# Patient Record
Sex: MALE | Race: WHITE | NOT HISPANIC OR LATINO | URBAN - METROPOLITAN AREA
[De-identification: names, ages, dates, MRNs, and addresses within clinical notes are randomized per-mention and may not be internally consistent; named-entity substitution may affect disease eponyms.]

---

## 2017-02-10 ENCOUNTER — EMERGENCY (EMERGENCY)
Facility: HOSPITAL | Age: 42
LOS: 0 days | Discharge: HOME | End: 2017-02-10
Attending: EMERGENCY MEDICINE
Payer: COMMERCIAL

## 2017-02-10 VITALS
WEIGHT: 154.1 LBS | TEMPERATURE: 98 F | HEIGHT: 69 IN | HEART RATE: 72 BPM | DIASTOLIC BLOOD PRESSURE: 80 MMHG | SYSTOLIC BLOOD PRESSURE: 125 MMHG | OXYGEN SATURATION: 97 % | RESPIRATION RATE: 18 BRPM
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
## 2017-02-10 DIAGNOSIS — S20.219A CONTUSION OF UNSPECIFIED FRONT WALL OF THORAX, INITIAL ENCOUNTER: ICD-10-CM

## 2017-02-10 DIAGNOSIS — S13.9XXA SPRAIN OF JOINTS AND LIGAMENTS OF UNSPECIFIED PARTS OF NECK, INITIAL ENCOUNTER: ICD-10-CM
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
## 2017-02-10 DIAGNOSIS — M54.2 CERVICALGIA: ICD-10-CM
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
## 2017-02-10 PROCEDURE — 99284 EMERGENCY DEPT VISIT MOD MDM: CPT

## 2017-02-10 PROCEDURE — 71020: CPT | Mod: 26

## 2017-02-10 PROCEDURE — 72040 X-RAY EXAM NECK SPINE 2-3 VW: CPT | Mod: 26

## 2017-02-10 RX ORDER — METHOCARBAMOL 500 MG/1
1000 TABLET, FILM COATED ORAL ONCE
Qty: 0 | Refills: 0 | Status: COMPLETED | OUTPATIENT
Start: 2017-02-10 | End: 2017-02-10

## 2017-02-10 RX ORDER — KETOROLAC TROMETHAMINE 30 MG/ML
60 SYRINGE (ML) INJECTION ONCE
Qty: 0 | Refills: 0 | Status: DISCONTINUED | OUTPATIENT
Start: 2017-02-10 | End: 2017-02-10

## 2017-02-10 RX ORDER — CYCLOBENZAPRINE HYDROCHLORIDE 10 MG/1
1 TABLET, FILM COATED ORAL
Qty: 10 | Refills: 0 | OUTPATIENT
Start: 2017-02-10 | End: 2017-02-15

## 2017-02-10 RX ADMIN — Medication 60 MILLIGRAM(S): at 18:48

## 2017-02-10 RX ADMIN — Medication 60 MILLIGRAM(S): at 18:22

## 2017-02-10 RX ADMIN — METHOCARBAMOL 1000 MILLIGRAM(S): 500 TABLET, FILM COATED ORAL at 18:22

## 2017-02-10 NOTE — ED ADULT NURSE NOTE - OBJECTIVE STATEMENT
Patient stated that he fell while snowboarding, pain to collar bones and chest, pain of 6 on a scale of 0 to 10

## 2017-02-10 NOTE — ED PROVIDER NOTE - CARE PLAN
Principal Discharge DX:	Contusion of chest wall  Secondary Diagnosis:	Cervical sprain, initial encounter  Secondary Diagnosis:	Fall

## 2017-02-16 ENCOUNTER — MEDICATION RENEWAL (OUTPATIENT)
Age: 42
End: 2017-02-16

## 2017-08-25 ENCOUNTER — MEDICATION RENEWAL (OUTPATIENT)
Age: 42
End: 2017-08-25

## 2017-10-30 ENCOUNTER — MEDICATION RENEWAL (OUTPATIENT)
Age: 42
End: 2017-10-30

## 2018-03-02 ENCOUNTER — EMERGENCY (EMERGENCY)
Facility: HOSPITAL | Age: 43
LOS: 0 days | Discharge: HOME | End: 2018-03-02
Attending: EMERGENCY MEDICINE
Payer: COMMERCIAL

## 2018-03-02 VITALS
HEIGHT: 69 IN | WEIGHT: 154.98 LBS | SYSTOLIC BLOOD PRESSURE: 126 MMHG | HEART RATE: 79 BPM | DIASTOLIC BLOOD PRESSURE: 88 MMHG | OXYGEN SATURATION: 100 % | RESPIRATION RATE: 19 BRPM | TEMPERATURE: 98 F

## 2018-03-02 DIAGNOSIS — R07.81 PLEURODYNIA: ICD-10-CM
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
## 2018-03-02 DIAGNOSIS — R06.7 SNEEZING: ICD-10-CM

## 2018-03-02 DIAGNOSIS — J30.2 OTHER SEASONAL ALLERGIC RHINITIS: ICD-10-CM

## 2018-03-02 DIAGNOSIS — S20.219A CONTUSION OF UNSPECIFIED FRONT WALL OF THORAX, INITIAL ENCOUNTER: ICD-10-CM

## 2018-03-02 DIAGNOSIS — M54.9 DORSALGIA, UNSPECIFIED: ICD-10-CM

## 2018-03-02 PROCEDURE — 99284 EMERGENCY DEPT VISIT MOD MDM: CPT

## 2018-03-02 PROCEDURE — 71111 X-RAY EXAM RIBS/CHEST4/> VWS: CPT | Mod: 26

## 2018-03-02 RX ORDER — IBUPROFEN 200 MG
1 TABLET ORAL
Qty: 15 | Refills: 0 | OUTPATIENT
Start: 2018-03-02 | End: 2018-03-06

## 2018-03-02 RX ORDER — IBUPROFEN 200 MG
600 TABLET ORAL ONCE
Qty: 0 | Refills: 0 | Status: COMPLETED | OUTPATIENT
Start: 2018-03-02 | End: 2018-03-02

## 2018-03-02 RX ORDER — BUDESONIDE AND FORMOTEROL FUMARATE DIHYDRATE 160; 4.5 UG/1; UG/1
2 AEROSOL RESPIRATORY (INHALATION)
Qty: 0 | Refills: 0 | COMMUNITY

## 2018-03-02 RX ADMIN — Medication 600 MILLIGRAM(S): at 11:41

## 2018-03-02 NOTE — ED PROVIDER NOTE - MEDICAL DECISION MAKING DETAILS
hx, exam, pt remain very comfortable sitting up in the chair denies sob, nausea, vomiting and dizziness. pt is given and explained all test reports and advised to follow up with his pmd as soon as possible.

## 2018-03-02 NOTE — ED PROVIDER NOTE - MUSCULOSKELETAL MINIMAL EXAM
tender to palp bilateral anterior and posterior chest increases tenderness to wrap his arms around this anterior chest/TENDERNESS

## 2018-03-02 NOTE — ED PROVIDER NOTE - CONSTITUTIONAL, MLM
normal... Well appearing, well nourished, awake, alert, oriented to person, place, time/situation and in no apparent distress. Speaking in clear full sentences no nasal flaring no shoulders retractions not holding his chest or back no active coughing, smiling appears very comfortable sitting up in the chair in a bright light room

## 2018-03-02 NOTE — ED ADULT TRIAGE NOTE - CHIEF COMPLAINT QUOTE
"Last week Wednesday I fell snowboarding and I think I broke a rib, it has been night days and it has been getting worse"

## 2018-04-17 ENCOUNTER — APPOINTMENT (OUTPATIENT)
Dept: INTERNAL MEDICINE | Facility: CLINIC | Age: 43
End: 2018-04-17
Payer: COMMERCIAL

## 2018-04-17 VITALS
HEART RATE: 71 BPM | RESPIRATION RATE: 16 BRPM | BODY MASS INDEX: 24.14 KG/M2 | SYSTOLIC BLOOD PRESSURE: 125 MMHG | HEIGHT: 69 IN | DIASTOLIC BLOOD PRESSURE: 77 MMHG | WEIGHT: 163 LBS | TEMPERATURE: 97.8 F | OXYGEN SATURATION: 97 %

## 2018-04-17 PROCEDURE — 99386 PREV VISIT NEW AGE 40-64: CPT

## 2018-04-17 RX ORDER — AMOXICILLIN 875 MG/1
875 TABLET, FILM COATED ORAL
Qty: 20 | Refills: 0 | Status: DISCONTINUED | COMMUNITY
Start: 2018-01-25 | End: 2018-04-17

## 2018-04-17 RX ORDER — NEOMYCIN SULFATE, POLYMYXIN B SULFATE, HYDROCORTISONE 3.5; 10000; 1 MG/ML; [USP'U]/ML; MG/ML
1 SOLUTION/ DROPS AURICULAR (OTIC)
Qty: 10 | Refills: 0 | Status: DISCONTINUED | COMMUNITY
Start: 2018-01-25 | End: 2018-04-17

## 2018-04-23 ENCOUNTER — LABORATORY RESULT (OUTPATIENT)
Age: 43
End: 2018-04-23

## 2018-04-24 ENCOUNTER — MESSAGE (OUTPATIENT)
Age: 43
End: 2018-04-24

## 2018-04-24 DIAGNOSIS — R82.90 UNSPECIFIED ABNORMAL FINDINGS IN URINE: ICD-10-CM

## 2018-04-24 DIAGNOSIS — R17 UNSPECIFIED JAUNDICE: ICD-10-CM

## 2018-04-24 DIAGNOSIS — R94.5 ABNORMAL RESULTS OF LIVER FUNCTION STUDIES: ICD-10-CM

## 2018-04-24 LAB
25(OH)D3 SERPL-MCNC: 26.6 NG/ML
ALBUMIN SERPL ELPH-MCNC: 4.5 G/DL
ALP BLD-CCNC: 76 U/L
ALT SERPL-CCNC: 54 U/L
ANION GAP SERPL CALC-SCNC: 15 MMOL/L
APPEARANCE: CLEAR
AST SERPL-CCNC: 43 U/L
BASOPHILS # BLD AUTO: 0.1 K/UL
BASOPHILS NFR BLD AUTO: 1.5 %
BILIRUB SERPL-MCNC: 1.5 MG/DL
BILIRUBIN URINE: NEGATIVE
BLOOD URINE: NEGATIVE
BUN SERPL-MCNC: 15 MG/DL
CALCIUM SERPL-MCNC: 9.9 MG/DL
CHLORIDE SERPL-SCNC: 103 MMOL/L
CHOLEST SERPL-MCNC: 197 MG/DL
CHOLEST/HDLC SERPL: 2.7 RATIO
CO2 SERPL-SCNC: 27 MMOL/L
COLOR: ABNORMAL
CREAT SERPL-MCNC: 1.09 MG/DL
EOSINOPHIL # BLD AUTO: 0.84 K/UL
EOSINOPHIL NFR BLD AUTO: 12.8 %
GLUCOSE QUALITATIVE U: NEGATIVE MG/DL
GLUCOSE SERPL-MCNC: 93 MG/DL
HBA1C MFR BLD HPLC: 5.2 %
HCT VFR BLD CALC: 45.7 %
HDLC SERPL-MCNC: 73 MG/DL
HGB BLD-MCNC: 15.5 G/DL
IMM GRANULOCYTES NFR BLD AUTO: 0.2 %
KETONES URINE: NEGATIVE
LDLC SERPL CALC-MCNC: 110 MG/DL
LEUKOCYTE ESTERASE URINE: NEGATIVE
LYMPHOCYTES # BLD AUTO: 2.2 K/UL
LYMPHOCYTES NFR BLD AUTO: 33.5 %
MAN DIFF?: NORMAL
MCHC RBC-ENTMCNC: 29.9 PG
MCHC RBC-ENTMCNC: 33.9 GM/DL
MCV RBC AUTO: 88.2 FL
MONOCYTES # BLD AUTO: 0.53 K/UL
MONOCYTES NFR BLD AUTO: 8.1 %
NEUTROPHILS # BLD AUTO: 2.88 K/UL
NEUTROPHILS NFR BLD AUTO: 43.9 %
NITRITE URINE: NEGATIVE
PH URINE: 5.5
PLATELET # BLD AUTO: 229 K/UL
POTASSIUM SERPL-SCNC: 4.8 MMOL/L
PROT SERPL-MCNC: 7.3 G/DL
PROTEIN URINE: ABNORMAL MG/DL
PSA SERPL-MCNC: 0.54 NG/ML
RBC # BLD: 5.18 M/UL
RBC # FLD: 13.6 %
SODIUM SERPL-SCNC: 145 MMOL/L
SPECIFIC GRAVITY URINE: 1.03
TRIGL SERPL-MCNC: 71 MG/DL
TSH SERPL-ACNC: 4.08 UIU/ML
UROBILINOGEN URINE: NEGATIVE MG/DL
WBC # FLD AUTO: 6.56 K/UL

## 2018-04-26 LAB
APPEARANCE: CLEAR
BILIRUBIN URINE: NEGATIVE
BLOOD URINE: NEGATIVE
COLOR: YELLOW
GLUCOSE QUALITATIVE U: NEGATIVE MG/DL
KETONES URINE: NEGATIVE
LEUKOCYTE ESTERASE URINE: NEGATIVE
NITRITE URINE: NEGATIVE
PH URINE: 7
PROTEIN URINE: NEGATIVE MG/DL
SPECIFIC GRAVITY URINE: 1.01
UROBILINOGEN URINE: NEGATIVE MG/DL

## 2018-04-27 LAB
ALBUMIN SERPL ELPH-MCNC: 4.7 G/DL
ALP BLD-CCNC: 75 U/L
ALT SERPL-CCNC: 46 U/L
ANION GAP SERPL CALC-SCNC: 15 MMOL/L
AST SERPL-CCNC: 33 U/L
BILIRUB SERPL-MCNC: 1.7 MG/DL
BUN SERPL-MCNC: 14 MG/DL
CALCIUM SERPL-MCNC: 9.9 MG/DL
CHLORIDE SERPL-SCNC: 99 MMOL/L
CO2 SERPL-SCNC: 26 MMOL/L
CREAT SERPL-MCNC: 0.99 MG/DL
GLUCOSE SERPL-MCNC: 81 MG/DL
POTASSIUM SERPL-SCNC: 4 MMOL/L
PROT SERPL-MCNC: 7.5 G/DL
SODIUM SERPL-SCNC: 140 MMOL/L

## 2018-09-27 ENCOUNTER — MEDICATION RENEWAL (OUTPATIENT)
Age: 43
End: 2018-09-27

## 2018-10-16 ENCOUNTER — APPOINTMENT (OUTPATIENT)
Dept: PULMONOLOGY | Facility: CLINIC | Age: 43
End: 2018-10-16
Payer: COMMERCIAL

## 2018-10-16 ENCOUNTER — OUTPATIENT (OUTPATIENT)
Dept: OUTPATIENT SERVICES | Facility: HOSPITAL | Age: 43
LOS: 1 days | End: 2018-10-16
Payer: COMMERCIAL

## 2018-10-16 ENCOUNTER — APPOINTMENT (OUTPATIENT)
Dept: RADIOLOGY | Facility: IMAGING CENTER | Age: 43
End: 2018-10-16
Payer: COMMERCIAL

## 2018-10-16 ENCOUNTER — LABORATORY RESULT (OUTPATIENT)
Age: 43
End: 2018-10-16

## 2018-10-16 VITALS
DIASTOLIC BLOOD PRESSURE: 80 MMHG | HEIGHT: 69 IN | WEIGHT: 166 LBS | RESPIRATION RATE: 16 BRPM | TEMPERATURE: 98.8 F | BODY MASS INDEX: 24.59 KG/M2 | SYSTOLIC BLOOD PRESSURE: 133 MMHG | HEART RATE: 85 BPM

## 2018-10-16 DIAGNOSIS — J45.901 UNSPECIFIED ASTHMA WITH (ACUTE) EXACERBATION: ICD-10-CM

## 2018-10-16 DIAGNOSIS — R06.02 SHORTNESS OF BREATH: ICD-10-CM

## 2018-10-16 PROCEDURE — 71045 X-RAY EXAM CHEST 1 VIEW: CPT

## 2018-10-16 PROCEDURE — 99215 OFFICE O/P EST HI 40 MIN: CPT

## 2018-10-16 PROCEDURE — 71045 X-RAY EXAM CHEST 1 VIEW: CPT | Mod: 26

## 2018-10-19 LAB
A ALTERNATA IGE QN: <0.1 KUA/L
A FUMIGATUS IGE QN: <0.1 KUA/L
BASOPHILS # BLD AUTO: 0.11 K/UL
BASOPHILS NFR BLD AUTO: 1.3 %
C ALBICANS IGE QN: <0.1 KUA/L
C HERBARUM IGE QN: <0.1 KUA/L
CAT DANDER IGE QN: 97.1 KUA/L
COMMON RAGWEED IGE QN: <0.1 KUA/L
D FARINAE IGE QN: <0.1 KUA/L
D PTERONYSS IGE QN: <0.1 KUA/L
DEPRECATED A ALTERNATA IGE RAST QL: 0
DEPRECATED A FUMIGATUS IGE RAST QL: 0
DEPRECATED C ALBICANS IGE RAST QL: 0
DEPRECATED C HERBARUM IGE RAST QL: 0
DEPRECATED CAT DANDER IGE RAST QL: ABNORMAL
DEPRECATED COMMON RAGWEED IGE RAST QL: 0
DEPRECATED D FARINAE IGE RAST QL: 0
DEPRECATED D PTERONYSS IGE RAST QL: 0
DEPRECATED DOG DANDER IGE RAST QL: ABNORMAL
DEPRECATED M RACEMOSUS IGE RAST QL: 0
DEPRECATED ROACH IGE RAST QL: 0
DEPRECATED TIMOTHY IGE RAST QL: 0
DEPRECATED WHITE OAK IGE RAST QL: 0
DOG DANDER IGE QN: 68.7 KUA/L
EOSINOPHIL # BLD AUTO: 0.69 K/UL
EOSINOPHIL NFR BLD AUTO: 8.2 %
HCT VFR BLD CALC: 45.2 %
HGB BLD-MCNC: 15.3 G/DL
IMM GRANULOCYTES NFR BLD AUTO: 1.8 %
LYMPHOCYTES # BLD AUTO: 3.05 K/UL
LYMPHOCYTES NFR BLD AUTO: 36.4 %
M RACEMOSUS IGE QN: <0.1 KUA/L
MAN DIFF?: NORMAL
MCHC RBC-ENTMCNC: 29.8 PG
MCHC RBC-ENTMCNC: 33.8 GM/DL
MCV RBC AUTO: 88.1 FL
MONOCYTES # BLD AUTO: 0.69 K/UL
MONOCYTES NFR BLD AUTO: 8.2 %
NEUTROPHILS # BLD AUTO: 3.68 K/UL
NEUTROPHILS NFR BLD AUTO: 44.1 %
PLATELET # BLD AUTO: 372 K/UL
RBC # BLD: 5.13 M/UL
RBC # FLD: 13.1 %
ROACH IGE QN: <0.1 KUA/L
TIMOTHY IGE QN: <0.1 KUA/L
TOTAL IGE SMQN RAST: 535 KU/L
WBC # FLD AUTO: 8.37 K/UL
WHITE OAK IGE QN: <0.1 KUA/L

## 2018-12-06 ENCOUNTER — APPOINTMENT (OUTPATIENT)
Dept: PULMONOLOGY | Facility: CLINIC | Age: 43
End: 2018-12-06
Payer: SELF-PAY

## 2018-12-06 VITALS
HEART RATE: 81 BPM | SYSTOLIC BLOOD PRESSURE: 124 MMHG | DIASTOLIC BLOOD PRESSURE: 76 MMHG | BODY MASS INDEX: 25.48 KG/M2 | TEMPERATURE: 97.3 F | OXYGEN SATURATION: 96 % | HEIGHT: 69 IN | WEIGHT: 172 LBS

## 2018-12-06 DIAGNOSIS — J30.9 ALLERGIC RHINITIS, UNSPECIFIED: ICD-10-CM

## 2018-12-06 PROCEDURE — ZZZZZ: CPT

## 2018-12-06 PROCEDURE — 94729 DIFFUSING CAPACITY: CPT

## 2018-12-06 PROCEDURE — 94060 EVALUATION OF WHEEZING: CPT

## 2018-12-06 PROCEDURE — 94726 PLETHYSMOGRAPHY LUNG VOLUMES: CPT

## 2018-12-06 PROCEDURE — 99214 OFFICE O/P EST MOD 30 MIN: CPT | Mod: 25

## 2019-10-27 ENCOUNTER — RX RENEWAL (OUTPATIENT)
Age: 44
End: 2019-10-27

## 2020-03-12 ENCOUNTER — RX RENEWAL (OUTPATIENT)
Age: 45
End: 2020-03-12

## 2021-11-20 ENCOUNTER — EMERGENCY (EMERGENCY)
Facility: HOSPITAL | Age: 46
LOS: 1 days | Discharge: HOME | End: 2021-11-20
Admitting: EMERGENCY MEDICINE
Payer: COMMERCIAL

## 2021-11-20 VITALS
HEIGHT: 69 IN | HEART RATE: 70 BPM | RESPIRATION RATE: 16 BRPM | DIASTOLIC BLOOD PRESSURE: 79 MMHG | OXYGEN SATURATION: 100 % | SYSTOLIC BLOOD PRESSURE: 129 MMHG | TEMPERATURE: 97 F

## 2021-11-20 PROCEDURE — 99284 EMERGENCY DEPT VISIT MOD MDM: CPT

## 2021-11-20 RX ORDER — LIDOCAINE 4 G/100G
1 CREAM TOPICAL ONCE
Refills: 0 | Status: COMPLETED | OUTPATIENT
Start: 2021-11-20 | End: 2021-11-20

## 2021-11-20 RX ORDER — ACETAMINOPHEN 500 MG
650 TABLET ORAL ONCE
Refills: 0 | Status: COMPLETED | OUTPATIENT
Start: 2021-11-20 | End: 2021-11-20

## 2021-11-20 RX ORDER — OXYCODONE HYDROCHLORIDE 5 MG/1
1 TABLET ORAL
Qty: 2 | Refills: 0
Start: 2021-11-20 | End: 2021-11-20

## 2021-11-20 RX ADMIN — Medication 650 MILLIGRAM(S): at 12:51

## 2021-11-20 RX ADMIN — LIDOCAINE 1 PATCH: 4 CREAM TOPICAL at 12:52

## 2021-11-20 NOTE — ED PROVIDER NOTE - PATIENT PORTAL LINK FT
You can access the FollowMyHealth Patient Portal offered by [facility] by registering at the following website: [email or web address]. By joining [facility]’s FollowMyHealth portal, you will also be able to view your health information using other applications (apps) compatible with our system.

## 2021-11-20 NOTE — ED PROVIDER NOTE - NS ED ROS FT
Constitutional: (-) Fever, (-) Chills  Skin: (-) Color changes, (-) Rashes, (-) Wounds  Eyes: (-) Visual changes, (-) Discharge, (-) Redness  CV: (-) Chest pain, (-) Palpitations  Resp: (-) Cough, (-) Shortness of breath  GI: (-) Abdominal pain, (-) Nausea, (-) Vomiting, (-) Diarrhea  : (-) Dysuria, (-) Hematuria, (-) Increased frequency, (-) Incontinence, (-) Retention, (-) Saddle anesthesia   MSK: (+) Back pain, (-) Myalgias  Neuro: (-) Headache

## 2021-11-20 NOTE — ED PROVIDER NOTE - NSFOLLOWUPINSTRUCTIONS_ED_ALL_ED_FT
Today you were seen in the ER for back pain.     Take Motrin 600 mg every 6-8 hours as needed for moderate pain -- take with food.    Take Tylenol 650mg (Two 325 mg pills) every 4-6 hours as needed for pain.    Take Oxycodone 5mg every 6 hours as needed for severe pain.     Back Pain    Back pain is very common in adults. The cause of back pain is rarely dangerous and the pain often gets better over time. The cause of your back pain may not be known and may include strain of muscles or ligaments, degeneration of the spinal disks, or arthritis. Occasionally the pain may radiate down your leg(s). Over-the-counter medicines to reduce pain and inflammation are often the most helpful. Stretching and remaining active frequently helps the healing process.     SEEK IMMEDIATE MEDICAL CARE IF YOU HAVE ANY OF THE FOLLOWING SYMPTOMS: bowel or bladder control problems, unusual weakness or numbness in your arms or legs, nausea or vomiting, abdominal pain, fever, dizziness/lightheadedness.    Advance activity as tolerated.     Continue all previously prescribed medications as directed unless otherwise instructed.     Follow up with your primary care physician in 48-72 hours- bring copies of your results.     Follow up with ortho spine as soon as possible. You should receive a call from the discharge lounge to assist with setting up this appointment.

## 2021-11-20 NOTE — ED PROVIDER NOTE - OBJECTIVE STATEMENT
46y Male with no sig PMHx presents to the ER for back pain. Patient reports atraumatic, bilateral lower back pain that has been going on since the end of October and got worse yesterday. States he drove from [facility] last night and had 10/10 bilateral, nonradiating lower back pain once he arrived. States pain is better when standing and stretching, worse with prolonged sitting. Denies fever, chills, numbness, tingling, bowel or bladder incontinence, urinary retention, saddles anesthesia. Pain minimally improved with Ibuprofen, last dose 8am.

## 2021-11-20 NOTE — ED PROVIDER NOTE - CPE EDP RESP NORM
Pt returned a call to Kelin Kerr for her results. She was not available at the time of her call. Please return her call at the earliest convenience. normal...

## 2021-11-20 NOTE — ED PROVIDER NOTE - CLINICAL SUMMARY MEDICAL DECISION MAKING FREE TEXT BOX
46y Male with no sig PMHx presents to the ER for back pain. Patient reports atraumatic, bilateral lower back pain that has been going on since the end of October and got worse yesterday. Denies fever, chills, numbness, tingling, bowel or bladder incontinence, urinary retention, saddles anesthesia. Pain minimally improved with Ibuprofen, last dose 8am. Vital signs stable. Bilateral paraspinal lumbar tenderness, no focal neuro deficit. Will get meds, ortho spine follow up, [place].

## 2021-11-24 ENCOUNTER — APPOINTMENT (OUTPATIENT)
Dept: ORTHOPEDIC SURGERY | Facility: CLINIC | Age: 46
End: 2021-11-24
Payer: COMMERCIAL

## 2021-11-24 VITALS — WEIGHT: 172 LBS | BODY MASS INDEX: 25.48 KG/M2 | HEIGHT: 69 IN

## 2021-11-24 DIAGNOSIS — M54.50 LOW BACK PAIN, UNSPECIFIED: ICD-10-CM

## 2021-11-24 PROCEDURE — 99203 OFFICE O/P NEW LOW 30 MIN: CPT

## 2021-11-24 PROCEDURE — 72100 X-RAY EXAM L-S SPINE 2/3 VWS: CPT

## 2021-11-24 NOTE — DISCUSSION/SUMMARY
[de-identified] : We discussed further treatment options.  He has now had symptoms for 1 month.  He has tried physical therapy medications without relief.  He will try an oral steroid taper.  He will also try some additional therapy but we will obtain a lumbar spine MRI.  Follow-up afterwards.

## 2021-11-24 NOTE — PHYSICAL EXAM
[Antalgic] : antalgic [Stooped] : stooped [de-identified] : Examination of the lumbar spine reveals no midline tenderness palpation, step-offs, or skin lesions. Decreased range of motion with respect to flexion, extension, lateral bending, and rotation. No tenderness to palpation of the sciatic notch. No tenderness palpation of the bilateral greater trochanters. No pain with passive internal/external rotation of the hips. No instability of bilateral lower extremities.  Negative ROBERT. Negative straight leg raise bilaterally. No bowstring. Negative femoral stretch. 5 out of 5 iliopsoas, hip abductors, hips adductors, quadriceps, hamstrings, gastrocsoleus, tibialis anterior, extensor hallucis longus, peroneals. Grossly intact sensation to light touch bilateral lower extremities. 1+ patellar and Achilles reflexes. Downgoing Babinski. No clonus. Intact proprioception. Palpable pulses. No skin lesion and no edema on the right and left lower extremities. [de-identified] : AP lateral lumbar x-rays reveal spondylosis without instability or aggressive lesions

## 2021-11-24 NOTE — HISTORY OF PRESENT ILLNESS
[de-identified] : Mr. NA SWANSON  is a 46 year old male who presents with low back pain since 10/29/21 that he feels began since he was working/sitting for very long days for months.  His pain was so bad that he went to the ER this past weekend.  He has been taking nsaids and using heat for symptom control.  Driving/sitting is his worst position.  Denies any LE radicular symptoms.  Normal bowel and bladder control.   Denies any recent fevers, chills, sweats, weight loss, or infection.  \par \par The patients past medical history, past surgical history, medications, allergies, and social history were reviewed by me today with the patient and documented accordingly.  In addition, the patient's family history, which is noncontributory to their visit, was also reviewed.\par

## 2021-12-04 ENCOUNTER — APPOINTMENT (OUTPATIENT)
Dept: MRI IMAGING | Facility: CLINIC | Age: 46
End: 2021-12-04
Payer: COMMERCIAL

## 2021-12-04 PROCEDURE — 72148 MRI LUMBAR SPINE W/O DYE: CPT

## 2021-12-17 ENCOUNTER — APPOINTMENT (OUTPATIENT)
Dept: ORTHOPEDIC SURGERY | Facility: CLINIC | Age: 46
End: 2021-12-17
Payer: COMMERCIAL

## 2021-12-17 DIAGNOSIS — M47.817 SPONDYLOSIS W/OUT MYELOPATHY OR RADICULOPATHY, LUMBOSACRAL REGION: ICD-10-CM

## 2021-12-17 PROCEDURE — 99442: CPT

## 2022-12-01 ENCOUNTER — APPOINTMENT (OUTPATIENT)
Dept: INTERNAL MEDICINE | Facility: CLINIC | Age: 47
End: 2022-12-01

## 2022-12-01 ENCOUNTER — NON-APPOINTMENT (OUTPATIENT)
Age: 47
End: 2022-12-01

## 2022-12-01 VITALS
BODY MASS INDEX: 34.55 KG/M2 | TEMPERATURE: 98 F | HEIGHT: 60 IN | OXYGEN SATURATION: 97 % | SYSTOLIC BLOOD PRESSURE: 131 MMHG | WEIGHT: 176 LBS | HEART RATE: 80 BPM | DIASTOLIC BLOOD PRESSURE: 83 MMHG

## 2022-12-01 DIAGNOSIS — Z00.00 ENCOUNTER FOR GENERAL ADULT MEDICAL EXAMINATION W/OUT ABNORMAL FINDINGS: ICD-10-CM

## 2022-12-01 DIAGNOSIS — Z12.11 ENCOUNTER FOR SCREENING FOR MALIGNANT NEOPLASM OF COLON: ICD-10-CM

## 2022-12-01 LAB
25(OH)D3 SERPL-MCNC: 30.9 NG/ML
ALBUMIN SERPL ELPH-MCNC: 4.6 G/DL
ALP BLD-CCNC: 79 U/L
ALT SERPL-CCNC: 27 U/L
ANION GAP SERPL CALC-SCNC: 11 MMOL/L
AST SERPL-CCNC: 22 U/L
BILIRUB SERPL-MCNC: 0.9 MG/DL
BUN SERPL-MCNC: 11 MG/DL
CALCIUM SERPL-MCNC: 9.7 MG/DL
CHLORIDE SERPL-SCNC: 103 MMOL/L
CHOLEST SERPL-MCNC: 234 MG/DL
CO2 SERPL-SCNC: 26 MMOL/L
CREAT SERPL-MCNC: 0.95 MG/DL
EGFR: 99 ML/MIN/1.73M2
GLUCOSE SERPL-MCNC: 87 MG/DL
HDLC SERPL-MCNC: 63 MG/DL
LDLC SERPL CALC-MCNC: 159 MG/DL
NONHDLC SERPL-MCNC: 171 MG/DL
POTASSIUM SERPL-SCNC: 4.3 MMOL/L
PROT SERPL-MCNC: 7.2 G/DL
SODIUM SERPL-SCNC: 140 MMOL/L
T4 FREE SERPL-MCNC: 1.1 NG/DL
TRIGL SERPL-MCNC: 59 MG/DL
TSH SERPL-ACNC: 4.89 UIU/ML
VIT B12 SERPL-MCNC: 1138 PG/ML

## 2022-12-01 PROCEDURE — 99396 PREV VISIT EST AGE 40-64: CPT | Mod: 25

## 2022-12-01 PROCEDURE — 90686 IIV4 VACC NO PRSV 0.5 ML IM: CPT

## 2022-12-01 PROCEDURE — G0008: CPT

## 2022-12-01 RX ORDER — DOXYCYCLINE HYCLATE 100 MG/1
100 CAPSULE ORAL
Qty: 42 | Refills: 0 | Status: DISCONTINUED | COMMUNITY
Start: 2022-06-23

## 2022-12-01 NOTE — HISTORY OF PRESENT ILLNESS
[de-identified] : 47 years old male presents for annual wellness visit, states had asthma exacerbation about two weeks ago, now improved, refers also h/o hemorrhoids

## 2022-12-01 NOTE — PLAN
[FreeTextEntry1] : 1. annual physical exam\par * routine general labs done\par * age appropriate health maintenance recommendations reviewed, discussed including healthy diet, regular exercise\par 2. asthma\par * continue albuterol HFA as needed\par * refill of Symbicort\par * avoidance of asthma triggers discussed, reviewed, he has two cats at home, advised to avoid contact with them and keep them in a separate room\par * advised pulmonary follow up\par 3. hemorrhoids\par * high fiber diet recommended\par * sitz baths\par * start Proctozone HC cream\par 4. colon cancer screening\par * FIT test\par * GI referral for colonoscopy\par 5. need for influenza vaccination\par * influenza vaccine administered\par * follow up one month.

## 2022-12-01 NOTE — HEALTH RISK ASSESSMENT
[Good] : ~his/her~  mood as  good [Never] : Never [No] : No [No falls in past year] : Patient reported no falls in the past year [0] : 2) Feeling down, depressed, or hopeless: Not at all (0) [PHQ-2 Negative - No further assessment needed] : PHQ-2 Negative - No further assessment needed [HIV Test offered] : HIV Test offered [Hepatitis C test offered] : Hepatitis C test offered [None] : None [With Family] : lives with family [Employed] : employed [Graduate School] : graduate school [] :  [# Of Children ___] : has [unfilled] children [Sexually Active] : sexually active [Feels Safe at Home] : Feels safe at home [Fully functional (bathing, dressing, toileting, transferring, walking, feeding)] : Fully functional (bathing, dressing, toileting, transferring, walking, feeding) [Fully functional (using the telephone, shopping, preparing meals, housekeeping, doing laundry, using] : Fully functional and needs no help or supervision to perform IADLs (using the telephone, shopping, preparing meals, housekeeping, doing laundry, using transportation, managing medications and managing finances) [Smoke Detector] : smoke detector [Carbon Monoxide Detector] : carbon monoxide detector [Seat Belt] :  uses seat belt [[id number]] : 0 [Change in mental status noted] : No change in mental status noted [Reports changes in hearing] : Reports no changes in hearing [Reports changes in vision] : Reports no changes in vision [Reports changes in dental health] : Reports no changes in dental health [Guns at Home] : no guns at home [FreeTextEntry2] :

## 2022-12-02 LAB
APPEARANCE: CLEAR
BACTERIA: NEGATIVE
BASOPHILS # BLD AUTO: 0.1 K/UL
BASOPHILS NFR BLD AUTO: 1.7 %
BILIRUBIN URINE: NEGATIVE
BLOOD URINE: NEGATIVE
COLOR: NORMAL
EOSINOPHIL # BLD AUTO: 0.58 K/UL
EOSINOPHIL NFR BLD AUTO: 9.7 %
ESTIMATED AVERAGE GLUCOSE: 103 MG/DL
GLUCOSE QUALITATIVE U: NEGATIVE
HBA1C MFR BLD HPLC: 5.2 %
HCT VFR BLD CALC: 46.2 %
HGB BLD-MCNC: 15.4 G/DL
HYALINE CASTS: 1 /LPF
IMM GRANULOCYTES NFR BLD AUTO: 0.3 %
KETONES URINE: NEGATIVE
LEUKOCYTE ESTERASE URINE: NEGATIVE
LYMPHOCYTES # BLD AUTO: 2.38 K/UL
LYMPHOCYTES NFR BLD AUTO: 39.8 %
MAN DIFF?: NORMAL
MCHC RBC-ENTMCNC: 29.1 PG
MCHC RBC-ENTMCNC: 33.3 GM/DL
MCV RBC AUTO: 87.3 FL
MICROSCOPIC-UA: NORMAL
MONOCYTES # BLD AUTO: 0.43 K/UL
MONOCYTES NFR BLD AUTO: 7.2 %
NEUTROPHILS # BLD AUTO: 2.47 K/UL
NEUTROPHILS NFR BLD AUTO: 41.3 %
NITRITE URINE: NEGATIVE
PH URINE: 7
PLATELET # BLD AUTO: 287 K/UL
PROTEIN URINE: NORMAL
RBC # BLD: 5.29 M/UL
RBC # FLD: 12.9 %
RED BLOOD CELLS URINE: 1 /HPF
SPECIFIC GRAVITY URINE: 1.02
SQUAMOUS EPITHELIAL CELLS: 0 /HPF
UROBILINOGEN URINE: NORMAL
WBC # FLD AUTO: 5.98 K/UL
WHITE BLOOD CELLS URINE: 1 /HPF

## 2023-01-09 ENCOUNTER — APPOINTMENT (OUTPATIENT)
Dept: INTERNAL MEDICINE | Facility: CLINIC | Age: 48
End: 2023-01-09
Payer: COMMERCIAL

## 2023-01-09 VITALS
OXYGEN SATURATION: 98 % | SYSTOLIC BLOOD PRESSURE: 135 MMHG | TEMPERATURE: 97.8 F | WEIGHT: 170 LBS | DIASTOLIC BLOOD PRESSURE: 80 MMHG | HEART RATE: 65 BPM | BODY MASS INDEX: 33.38 KG/M2 | HEIGHT: 60 IN

## 2023-01-09 PROCEDURE — 99214 OFFICE O/P EST MOD 30 MIN: CPT

## 2023-01-09 RX ORDER — ALBUTEROL SULFATE 90 UG/1
108 (90 BASE) AEROSOL, METERED RESPIRATORY (INHALATION) EVERY 6 HOURS
Qty: 1 | Refills: 3 | Status: ACTIVE | COMMUNITY
Start: 2018-04-17 | End: 1900-01-01

## 2023-01-09 NOTE — HISTORY OF PRESENT ILLNESS
[FreeTextEntry1] : follow up [de-identified] : 47 years old male presents for follow up to review and discuss labs test results; no complaints; states he didn’t receive inhalers because insurance didn’t approve them, wants them to be sent to mail order pharmacy

## 2023-01-09 NOTE — PLAN
[FreeTextEntry1] : * GI referral for colonoscopy given again, instructed to schedule consultation\par * low cholesterol, low triglycerides diet,dietary counseling given; dietary avoidance discussed; diet and exercise reviewed with patient\par * new prescription for Ventolin and Symbicort\par * follow up three months.

## 2023-05-16 ENCOUNTER — APPOINTMENT (OUTPATIENT)
Dept: INTERNAL MEDICINE | Facility: CLINIC | Age: 48
End: 2023-05-16
Payer: COMMERCIAL

## 2023-05-16 VITALS
SYSTOLIC BLOOD PRESSURE: 128 MMHG | HEIGHT: 69 IN | DIASTOLIC BLOOD PRESSURE: 84 MMHG | HEART RATE: 75 BPM | WEIGHT: 172 LBS | OXYGEN SATURATION: 97 % | BODY MASS INDEX: 25.48 KG/M2 | TEMPERATURE: 98 F

## 2023-05-16 DIAGNOSIS — R19.5 OTHER FECAL ABNORMALITIES: ICD-10-CM

## 2023-05-16 PROCEDURE — 99214 OFFICE O/P EST MOD 30 MIN: CPT

## 2023-05-16 NOTE — HISTORY OF PRESENT ILLNESS
[FreeTextEntry1] : follow up [de-identified] : 47 years old male presents for follow up, states feeling well ; has not schedule GI consult for colonoscopy yet, had positive FIT test, h/o hemorrhoids; due for labs to monitor lipids on diet for high cholesterol, states being compliant.

## 2023-05-16 NOTE — PLAN
[FreeTextEntry1] : * lab for fasting lipids, CMP\par * low cholesterol, low triglycerides diet,dietary counseling given; dietary avoidance discussed; diet and exercise reviewed with patient\par * importance of colonoscopy for colon cancer screening reviewed, stressed\par * instructed to schedule GI consult\par * asthma stable; needs refill of symbicort\par * will call back for results\par * f/u six months

## 2023-05-17 LAB
ALBUMIN SERPL ELPH-MCNC: 4.8 G/DL
ALP BLD-CCNC: 83 U/L
ALT SERPL-CCNC: 35 U/L
ANION GAP SERPL CALC-SCNC: 11 MMOL/L
AST SERPL-CCNC: 24 U/L
BILIRUB SERPL-MCNC: 1.4 MG/DL
BUN SERPL-MCNC: 16 MG/DL
CALCIUM SERPL-MCNC: 10.1 MG/DL
CHLORIDE SERPL-SCNC: 104 MMOL/L
CHOLEST SERPL-MCNC: 209 MG/DL
CO2 SERPL-SCNC: 25 MMOL/L
CREAT SERPL-MCNC: 0.9 MG/DL
EGFR: 105 ML/MIN/1.73M2
GLUCOSE SERPL-MCNC: 79 MG/DL
HDLC SERPL-MCNC: 75 MG/DL
LDLC SERPL CALC-MCNC: 118 MG/DL
NONHDLC SERPL-MCNC: 134 MG/DL
POTASSIUM SERPL-SCNC: 4.4 MMOL/L
PROT SERPL-MCNC: 7.5 G/DL
SODIUM SERPL-SCNC: 140 MMOL/L
TRIGL SERPL-MCNC: 80 MG/DL

## 2023-06-19 ENCOUNTER — APPOINTMENT (OUTPATIENT)
Dept: GASTROENTEROLOGY | Facility: CLINIC | Age: 48
End: 2023-06-19
Payer: COMMERCIAL

## 2023-06-19 VITALS
DIASTOLIC BLOOD PRESSURE: 80 MMHG | WEIGHT: 166 LBS | TEMPERATURE: 97.5 F | SYSTOLIC BLOOD PRESSURE: 110 MMHG | HEIGHT: 69 IN | BODY MASS INDEX: 24.59 KG/M2

## 2023-06-19 DIAGNOSIS — K64.9 UNSPECIFIED HEMORRHOIDS: ICD-10-CM

## 2023-06-19 DIAGNOSIS — Z80.9 FAMILY HISTORY OF MALIGNANT NEOPLASM, UNSPECIFIED: ICD-10-CM

## 2023-06-19 PROCEDURE — 99204 OFFICE O/P NEW MOD 45 MIN: CPT

## 2023-06-19 RX ORDER — MONTELUKAST 10 MG/1
10 TABLET, FILM COATED ORAL
Qty: 1 | Refills: 3 | Status: DISCONTINUED | COMMUNITY
Start: 2018-10-16 | End: 2023-06-19

## 2023-06-19 RX ORDER — SODIUM SULFATE, POTASSIUM SULFATE AND MAGNESIUM SULFATE 1.6; 3.13; 17.5 G/177ML; G/177ML; G/177ML
17.5-3.13-1.6 SOLUTION ORAL
Qty: 354 | Refills: 0 | Status: ACTIVE | COMMUNITY
Start: 2023-06-19 | End: 1900-01-01

## 2023-06-19 RX ORDER — HYDROCORTISONE 2.5% 25 MG/G
2.5 CREAM TOPICAL
Qty: 1 | Refills: 2 | Status: DISCONTINUED | COMMUNITY
Start: 2022-12-01 | End: 2023-06-19

## 2023-06-19 RX ORDER — METHYLPREDNISOLONE 4 MG/1
4 TABLET ORAL
Qty: 1 | Refills: 0 | Status: DISCONTINUED | COMMUNITY
Start: 2021-11-24 | End: 2023-06-19

## 2023-06-19 NOTE — HISTORY OF PRESENT ILLNESS
[FreeTextEntry1] : Jun 19, 2023 \par \par MAR CAMPBELL MD\par \par Pleasant 47-year-old gentleman\par \par Noted to have mild persistent elevated bilirubin consistent with Gilbert's syndrome\par \par Occasional blood per rectum and small hemorrhoids\par \par Mr. NA SWANSON 47 year is referred for colon cancer screening.  The patient denies any change in bowel movements,  abdominal, pelvic or rectal discomfort.   \par \par There is no family history of colon cancer or other gastrointestinal cancers.\par \par The patient denies any unexplained weight loss, fever chills or night sweats.\par \par This is the first screening colonoscopy for the patient.\par \par There is no significant cardiac or pulmonary history.\par \par No complaints of chest pain, shortness of breath, palpitations, cough.\par \par The patient is feeling quite well.\par \par The patient is on no significant anticoagulant therapy or anti platelet therapy. \par \par No adverse reaction to anesthesia in the past.\par \par

## 2023-06-19 NOTE — ASSESSMENT
[FreeTextEntry1] : Impression\par \par Blood per rectum, hemorrhoids\par \par Request for colon cancer screening\par \par Suggest\par \par High-fiber diet\par \par Colace\par \par Sitz bath's\par \par Agree with colonoscopy\par \par Suprep\par \par The laxative, or its risks benefits and alternatives have been thoroughly reviewed with the patient in great detail. The laxative instructions have been reviewed in great detail with the patient.\par \par Risks/benefits:\par The procedure, the risks and benefits and alternatives have been reviewed in great detail with the patient.  Risks including, but not limited to sedation such as cardiac and pulmonary compromise, the procedure itself such as bleeding requiring hospitalization, transfusion, surgery, temporary or permanent colostomy.  Perforation or puncture of the requiring hospitalization, surgery, temporary colostomy.\par It has been explained to the patient that though colonoscopy is thought to be the best screening exam for colon cancer and polyps, no screening exam can find all colon polyps or cancers.  \par The patient expresses understanding of the procedure and consents to undergoing the procedure.\par

## 2023-06-19 NOTE — PHYSICAL EXAM
[Alert] : alert [Normal Voice/Communication] : normal voice/communication [Healthy Appearing] : healthy appearing [No Acute Distress] : no acute distress [Sclera] : the sclera and conjunctiva were normal [Hearing Threshold Finger Rub Not [place]] : hearing was normal [Normal Appearance] : the appearance of the neck was normal [No Respiratory Distress] : no respiratory distress [No Acc Muscle Use] : no accessory muscle use [Respiration, Rhythm And Depth] : normal respiratory rhythm and effort [Heart Rate And Rhythm] : heart rate was normal and rhythm regular [None] : no edema [Bowel Sounds] : normal bowel sounds [Abdomen Tenderness] : non-tender [No Masses] : no abdominal mass palpated [Abdomen Soft] : soft [] : no hepatosplenomegaly [Oriented To Time, Place, And Person] : oriented to person, place, and time [de-identified] : No stigmata of chronic liver disease [de-identified] : Small external hemorrhoids, normal anal sphincter tone, nontender, no blood, no mass, no stool

## 2023-06-19 NOTE — REASON FOR VISIT
[Initial Evaluation] : an initial evaluation [FreeTextEntry1] : Blood per rectum, hemorrhoid, colon cancer screening, first exam, noted to have mild persistent elevated bilirubin consistent with Gilbert's syndrome

## 2023-08-01 ENCOUNTER — APPOINTMENT (OUTPATIENT)
Dept: GASTROENTEROLOGY | Facility: AMBULATORY MEDICAL SERVICES | Age: 48
End: 2023-08-01
Payer: COMMERCIAL

## 2023-08-01 PROCEDURE — 45380 COLONOSCOPY AND BIOPSY: CPT | Mod: 33

## 2023-08-14 ENCOUNTER — NON-APPOINTMENT (OUTPATIENT)
Age: 48
End: 2023-08-14

## 2023-12-04 ENCOUNTER — APPOINTMENT (OUTPATIENT)
Dept: INTERNAL MEDICINE | Facility: CLINIC | Age: 48
End: 2023-12-04
Payer: COMMERCIAL

## 2023-12-04 VITALS
WEIGHT: 165 LBS | OXYGEN SATURATION: 98 % | TEMPERATURE: 97.8 F | SYSTOLIC BLOOD PRESSURE: 125 MMHG | BODY MASS INDEX: 24.44 KG/M2 | RESPIRATION RATE: 16 BRPM | DIASTOLIC BLOOD PRESSURE: 84 MMHG | HEIGHT: 69 IN | HEART RATE: 71 BPM

## 2023-12-04 DIAGNOSIS — Z23 ENCOUNTER FOR IMMUNIZATION: ICD-10-CM

## 2023-12-04 LAB
BASOPHILS # BLD AUTO: 0.09 K/UL
BASOPHILS NFR BLD AUTO: 1.4 %
EOSINOPHIL # BLD AUTO: 0.44 K/UL
EOSINOPHIL NFR BLD AUTO: 7 %
HCT VFR BLD CALC: 46 %
HGB BLD-MCNC: 15 G/DL
IMM GRANULOCYTES NFR BLD AUTO: 0.3 %
LYMPHOCYTES # BLD AUTO: 2.38 K/UL
LYMPHOCYTES NFR BLD AUTO: 37.7 %
MAN DIFF?: NORMAL
MCHC RBC-ENTMCNC: 29 PG
MCHC RBC-ENTMCNC: 32.6 GM/DL
MCV RBC AUTO: 88.8 FL
MONOCYTES # BLD AUTO: 0.46 K/UL
MONOCYTES NFR BLD AUTO: 7.3 %
NEUTROPHILS # BLD AUTO: 2.92 K/UL
NEUTROPHILS NFR BLD AUTO: 46.3 %
PLATELET # BLD AUTO: 243 K/UL
RBC # BLD: 5.18 M/UL
RBC # FLD: 12.7 %
WBC # FLD AUTO: 6.31 K/UL

## 2023-12-04 PROCEDURE — 99214 OFFICE O/P EST MOD 30 MIN: CPT

## 2023-12-05 LAB
25(OH)D3 SERPL-MCNC: 28.3 NG/ML
ALBUMIN SERPL ELPH-MCNC: 4.7 G/DL
ALP BLD-CCNC: 80 U/L
ALT SERPL-CCNC: 18 U/L
ANION GAP SERPL CALC-SCNC: 13 MMOL/L
APPEARANCE: CLEAR
AST SERPL-CCNC: 19 U/L
BACTERIA: NEGATIVE /HPF
BILIRUB SERPL-MCNC: 1.6 MG/DL
BILIRUBIN URINE: NEGATIVE
BLOOD URINE: NEGATIVE
BUN SERPL-MCNC: 15 MG/DL
CALCIUM SERPL-MCNC: 9.4 MG/DL
CAST: 0 /LPF
CHLORIDE SERPL-SCNC: 104 MMOL/L
CHOLEST SERPL-MCNC: 205 MG/DL
CO2 SERPL-SCNC: 25 MMOL/L
COLOR: YELLOW
CREAT SERPL-MCNC: 1.08 MG/DL
EGFR: 84 ML/MIN/1.73M2
EPITHELIAL CELLS: 1 /HPF
ESTIMATED AVERAGE GLUCOSE: 97 MG/DL
GLUCOSE QUALITATIVE U: NEGATIVE MG/DL
GLUCOSE SERPL-MCNC: 83 MG/DL
HBA1C MFR BLD HPLC: 5 %
HDLC SERPL-MCNC: 74 MG/DL
KETONES URINE: NEGATIVE MG/DL
LDLC SERPL CALC-MCNC: 120 MG/DL
LEUKOCYTE ESTERASE URINE: NEGATIVE
MICROSCOPIC-UA: NORMAL
NITRITE URINE: NEGATIVE
NONHDLC SERPL-MCNC: 131 MG/DL
PH URINE: 6
POTASSIUM SERPL-SCNC: 4.2 MMOL/L
PROT SERPL-MCNC: 7.1 G/DL
PROTEIN URINE: NEGATIVE MG/DL
PSA FREE FLD-MCNC: 41 %
PSA FREE SERPL-MCNC: 0.35 NG/ML
PSA SERPL-MCNC: 0.84 NG/ML
RED BLOOD CELLS URINE: 0 /HPF
SODIUM SERPL-SCNC: 142 MMOL/L
SPECIFIC GRAVITY URINE: 1.03
T4 FREE SERPL-MCNC: 1.1 NG/DL
TRIGL SERPL-MCNC: 60 MG/DL
TSH SERPL-ACNC: 3.05 UIU/ML
UROBILINOGEN URINE: 0.2 MG/DL
VIT B12 SERPL-MCNC: 849 PG/ML
WHITE BLOOD CELLS URINE: 0 /HPF

## 2024-02-05 ENCOUNTER — APPOINTMENT (OUTPATIENT)
Dept: INTERNAL MEDICINE | Facility: CLINIC | Age: 49
End: 2024-02-05
Payer: COMMERCIAL

## 2024-02-05 VITALS
RESPIRATION RATE: 16 BRPM | OXYGEN SATURATION: 98 % | SYSTOLIC BLOOD PRESSURE: 116 MMHG | BODY MASS INDEX: 24.44 KG/M2 | DIASTOLIC BLOOD PRESSURE: 81 MMHG | HEIGHT: 69 IN | TEMPERATURE: 98.6 F | WEIGHT: 165 LBS | HEART RATE: 60 BPM

## 2024-02-05 DIAGNOSIS — J45.909 UNSPECIFIED ASTHMA, UNCOMPLICATED: ICD-10-CM

## 2024-02-05 DIAGNOSIS — E55.9 VITAMIN D DEFICIENCY, UNSPECIFIED: ICD-10-CM

## 2024-02-05 DIAGNOSIS — B35.1 TINEA UNGUIUM: ICD-10-CM

## 2024-02-05 DIAGNOSIS — E78.00 PURE HYPERCHOLESTEROLEMIA, UNSPECIFIED: ICD-10-CM

## 2024-02-05 PROCEDURE — G2211 COMPLEX E/M VISIT ADD ON: CPT

## 2024-02-05 PROCEDURE — 99214 OFFICE O/P EST MOD 30 MIN: CPT

## 2024-02-05 RX ORDER — CICLOPIROX 80 MG/ML
8 SOLUTION TOPICAL
Qty: 3 | Refills: 2 | Status: ACTIVE | COMMUNITY
Start: 2023-12-04 | End: 1900-01-01

## 2024-02-05 RX ORDER — BUDESONIDE AND FORMOTEROL FUMARATE DIHYDRATE 160; 4.5 UG/1; UG/1
160-4.5 AEROSOL RESPIRATORY (INHALATION) TWICE DAILY
Qty: 1 | Refills: 3 | Status: ACTIVE | COMMUNITY
Start: 2022-12-01 | End: 1900-01-01

## 2024-02-05 NOTE — PLAN
[FreeTextEntry1] : * low cholesterol, low triglycerides diet,dietary counseling given; dietary avoidance discussed; diet and exercise reviewed with patient * c/w ciclopirox nail solution refilled * c/w albuterol HFA inhaler and Symbicort, refilled * recommended daily vitamin D supplement * f/u six months

## 2024-02-05 NOTE — HISTORY OF PRESENT ILLNESS
[FreeTextEntry1] : follow up [de-identified] : 48 years old male presents for follow up to review and discuss labs test results; no complaints, needs refills

## 2024-07-19 NOTE — ED PROVIDER NOTE - CARDIOVASCULAR NEGATIVE STATEMENT, MLM
Confirmed with TG that Gatorade drink is appropriate for patient.   normal rate and rhythm, no chest pain and no edema.

## 2024-08-05 ENCOUNTER — APPOINTMENT (OUTPATIENT)
Dept: INTERNAL MEDICINE | Facility: CLINIC | Age: 49
End: 2024-08-05

## 2024-08-05 ENCOUNTER — NON-APPOINTMENT (OUTPATIENT)
Age: 49
End: 2024-08-05

## 2024-08-05 PROCEDURE — 99396 PREV VISIT EST AGE 40-64: CPT

## 2024-08-05 NOTE — HISTORY OF PRESENT ILLNESS
[de-identified] : 48 years old male presents for annual physical exam, states fungal toenail infection hasn't improved with topical solution.

## 2024-08-05 NOTE — PLAN
[FreeTextEntry1] : 1. annual physical exam * routine general labs done * age appropriate health maintenance recommendations reviewed, discussed including healthy diet, regular exercise 2. asthma * stable on albuterol HFA as needed and Symbicort, refilled 3. hypercholeserolemia * low cholesterol, low triglycerides diet, dietary counseling given; dietary avoidance discussed; diet and exercise reviewed with patient 4. vitamin D insufficiency * recommended daily supplement 5. tinea unguium * will start terbinafine if hepatic function normal 6. colon cancer screening * UTD with colonoscopy * f/u in six months.

## 2024-08-05 NOTE — HEALTH RISK ASSESSMENT
[Good] : ~his/her~  mood as  good [No] : No [No falls in past year] : Patient reported no falls in the past year [0] : 2) Feeling down, depressed, or hopeless: Not at all (0) [PHQ-2 Negative - No further assessment needed] : PHQ-2 Negative - No further assessment needed [Never] : Never [NO] : No [Patient reported colonoscopy was normal] : Patient reported colonoscopy was normal [HIV test declined] : HIV test declined [Hepatitis C test declined] : Hepatitis C test declined [None] : None [With Family] : lives with family [Employed] : employed [Graduate School] : graduate school [] :  [# Of Children ___] : has [unfilled] children [Sexually Active] : sexually active [Feels Safe at Home] : Feels safe at home [Fully functional (bathing, dressing, toileting, transferring, walking, feeding)] : Fully functional (bathing, dressing, toileting, transferring, walking, feeding) [Fully functional (using the telephone, shopping, preparing meals, housekeeping, doing laundry, using] : Fully functional and needs no help or supervision to perform IADLs (using the telephone, shopping, preparing meals, housekeeping, doing laundry, using transportation, managing medications and managing finances) [Smoke Detector] : smoke detector [Carbon Monoxide Detector] : carbon monoxide detector [Seat Belt] :  uses seat belt [[id number]] : 0 [Change in mental status noted] : No change in mental status noted [Reports changes in hearing] : Reports no changes in hearing [Reports changes in vision] : Reports no changes in vision [Reports changes in dental health] : Reports no changes in dental health [ColonoscopyDate] : 08/2023 [ColonoscopyComments] : hemorrhoids [FreeTextEntry2] :

## 2024-11-09 ENCOUNTER — NON-APPOINTMENT (OUTPATIENT)
Age: 49
End: 2024-11-09

## 2024-11-11 RX ORDER — TERBINAFINE HYDROCHLORIDE 250 MG/1
250 TABLET ORAL DAILY
Qty: 30 | Refills: 1 | Status: ACTIVE | COMMUNITY
Start: 2024-11-11 | End: 1900-01-01

## 2024-11-12 ENCOUNTER — APPOINTMENT (OUTPATIENT)
Dept: ORTHOPEDIC SURGERY | Facility: CLINIC | Age: 49
End: 2024-11-12
Payer: COMMERCIAL

## 2024-11-12 VITALS — HEIGHT: 69 IN | WEIGHT: 163 LBS | BODY MASS INDEX: 24.14 KG/M2

## 2024-11-12 DIAGNOSIS — M25.531 PAIN IN RIGHT WRIST: ICD-10-CM

## 2024-11-12 DIAGNOSIS — S62.174A NONDISPLACED FRACTURE OF TRAPEZIUM [LARGER MULTANGULAR], RIGHT WRIST, INITIAL ENCOUNTER FOR CLOSED FRACTURE: ICD-10-CM

## 2024-11-12 PROCEDURE — 25630 CLTX CARPL FX W/O MNPJ EA B1: CPT | Mod: RT

## 2024-11-12 PROCEDURE — 99204 OFFICE O/P NEW MOD 45 MIN: CPT | Mod: 57

## 2024-11-12 PROCEDURE — 73100 X-RAY EXAM OF WRIST: CPT | Mod: RT

## 2024-11-22 ENCOUNTER — APPOINTMENT (OUTPATIENT)
Dept: ORTHOPEDIC SURGERY | Facility: CLINIC | Age: 49
End: 2024-11-22
Payer: COMMERCIAL

## 2024-11-22 ENCOUNTER — APPOINTMENT (OUTPATIENT)
Dept: MRI IMAGING | Facility: CLINIC | Age: 49
End: 2024-11-22
Payer: COMMERCIAL

## 2024-11-22 DIAGNOSIS — S40.011A CONTUSION OF RIGHT SHOULDER, INITIAL ENCOUNTER: ICD-10-CM

## 2024-11-22 DIAGNOSIS — S46.911A STRAIN OF UNSPECIFIED MUSCLE, FASCIA AND TENDON AT SHOULDER AND UPPER ARM LEVEL, RIGHT ARM, INITIAL ENCOUNTER: ICD-10-CM

## 2024-11-22 PROCEDURE — 73030 X-RAY EXAM OF SHOULDER: CPT | Mod: RT

## 2024-11-22 PROCEDURE — 73010 X-RAY EXAM OF SHOULDER BLADE: CPT | Mod: RT

## 2024-11-22 PROCEDURE — 99214 OFFICE O/P EST MOD 30 MIN: CPT

## 2024-11-22 PROCEDURE — 73221 MRI JOINT UPR EXTREM W/O DYE: CPT | Mod: RT

## 2024-11-22 PROCEDURE — 99204 OFFICE O/P NEW MOD 45 MIN: CPT

## 2024-11-22 RX ORDER — DICLOFENAC SODIUM 75 MG/1
75 TABLET, DELAYED RELEASE ORAL TWICE DAILY
Qty: 60 | Refills: 2 | Status: ACTIVE | COMMUNITY
Start: 2024-11-22 | End: 2025-02-20

## 2024-11-27 ENCOUNTER — APPOINTMENT (OUTPATIENT)
Dept: ORTHOPEDIC SURGERY | Facility: CLINIC | Age: 49
End: 2024-11-27
Payer: COMMERCIAL

## 2024-11-27 ENCOUNTER — APPOINTMENT (OUTPATIENT)
Dept: MRI IMAGING | Facility: CLINIC | Age: 49
End: 2024-11-27

## 2024-11-27 DIAGNOSIS — S42.251D DISPLACED FRACTURE OF GREATER TUBEROSITY OF RIGHT HUMERUS, SUBSEQUENT ENCOUNTER FOR FRACTURE WITH ROUTINE HEALING: ICD-10-CM

## 2024-11-27 PROCEDURE — 99214 OFFICE O/P EST MOD 30 MIN: CPT

## 2024-12-03 ENCOUNTER — APPOINTMENT (OUTPATIENT)
Dept: ORTHOPEDIC SURGERY | Facility: CLINIC | Age: 49
End: 2024-12-03
Payer: COMMERCIAL

## 2024-12-03 DIAGNOSIS — S62.174A NONDISPLACED FRACTURE OF TRAPEZIUM [LARGER MULTANGULAR], RIGHT WRIST, INITIAL ENCOUNTER FOR CLOSED FRACTURE: ICD-10-CM

## 2024-12-03 PROCEDURE — 99024 POSTOP FOLLOW-UP VISIT: CPT

## 2025-02-03 ENCOUNTER — APPOINTMENT (OUTPATIENT)
Dept: INTERNAL MEDICINE | Facility: CLINIC | Age: 50
End: 2025-02-03
Payer: COMMERCIAL

## 2025-02-03 VITALS
BODY MASS INDEX: 24.14 KG/M2 | HEART RATE: 62 BPM | TEMPERATURE: 98.3 F | RESPIRATION RATE: 16 BRPM | OXYGEN SATURATION: 98 % | SYSTOLIC BLOOD PRESSURE: 125 MMHG | DIASTOLIC BLOOD PRESSURE: 75 MMHG | WEIGHT: 163 LBS | HEIGHT: 69 IN

## 2025-02-03 DIAGNOSIS — B35.1 TINEA UNGUIUM: ICD-10-CM

## 2025-02-03 DIAGNOSIS — E78.00 PURE HYPERCHOLESTEROLEMIA, UNSPECIFIED: ICD-10-CM

## 2025-02-03 PROCEDURE — G2211 COMPLEX E/M VISIT ADD ON: CPT | Mod: NC

## 2025-02-03 PROCEDURE — 99214 OFFICE O/P EST MOD 30 MIN: CPT

## 2025-02-04 LAB
ALBUMIN SERPL ELPH-MCNC: 4.9 G/DL
ALP BLD-CCNC: 101 U/L
ALT SERPL-CCNC: 20 U/L
ANION GAP SERPL CALC-SCNC: 14 MMOL/L
AST SERPL-CCNC: 26 U/L
BILIRUB SERPL-MCNC: 2.4 MG/DL
BUN SERPL-MCNC: 15 MG/DL
CALCIUM SERPL-MCNC: 10.1 MG/DL
CHLORIDE SERPL-SCNC: 96 MMOL/L
CHOLEST SERPL-MCNC: 215 MG/DL
CO2 SERPL-SCNC: 26 MMOL/L
CREAT SERPL-MCNC: 1.15 MG/DL
EGFR: 78 ML/MIN/1.73M2
GLUCOSE SERPL-MCNC: 69 MG/DL
HDLC SERPL-MCNC: 71 MG/DL
LDLC SERPL CALC-MCNC: 131 MG/DL
NONHDLC SERPL-MCNC: 144 MG/DL
POTASSIUM SERPL-SCNC: 4.3 MMOL/L
PROT SERPL-MCNC: 7.7 G/DL
SODIUM SERPL-SCNC: 136 MMOL/L
TRIGL SERPL-MCNC: 74 MG/DL

## 2025-02-07 ENCOUNTER — OUTPATIENT (OUTPATIENT)
Dept: OUTPATIENT SERVICES | Facility: HOSPITAL | Age: 50
LOS: 1 days | End: 2025-02-07
Payer: COMMERCIAL

## 2025-02-07 ENCOUNTER — APPOINTMENT (OUTPATIENT)
Dept: ULTRASOUND IMAGING | Facility: CLINIC | Age: 50
End: 2025-02-07

## 2025-02-07 DIAGNOSIS — R17 UNSPECIFIED JAUNDICE: ICD-10-CM

## 2025-02-07 PROCEDURE — 76700 US EXAM ABDOM COMPLETE: CPT | Mod: 26

## 2025-02-07 PROCEDURE — 76700 US EXAM ABDOM COMPLETE: CPT

## 2025-02-11 DIAGNOSIS — K82.4 CHOLESTEROLOSIS OF GALLBLADDER: ICD-10-CM

## 2025-02-25 ENCOUNTER — APPOINTMENT (OUTPATIENT)
Dept: GASTROENTEROLOGY | Facility: CLINIC | Age: 50
End: 2025-02-25
Payer: COMMERCIAL

## 2025-02-25 VITALS
SYSTOLIC BLOOD PRESSURE: 120 MMHG | OXYGEN SATURATION: 97 % | HEIGHT: 69 IN | BODY MASS INDEX: 23.7 KG/M2 | TEMPERATURE: 96.6 F | DIASTOLIC BLOOD PRESSURE: 70 MMHG | HEART RATE: 71 BPM | WEIGHT: 160 LBS

## 2025-02-25 DIAGNOSIS — K82.4 CHOLESTEROLOSIS OF GALLBLADDER: ICD-10-CM

## 2025-02-25 DIAGNOSIS — E80.6 OTHER DISORDERS OF BILIRUBIN METABOLISM: ICD-10-CM

## 2025-02-25 PROCEDURE — 99214 OFFICE O/P EST MOD 30 MIN: CPT

## 2025-03-07 LAB
A1AT SERPL-MCNC: 161 MG/DL
ALBUMIN SERPL ELPH-MCNC: 4.8 G/DL
ALP BLD-CCNC: 95 U/L
ALT SERPL-CCNC: 23 U/L
AST SERPL-CCNC: 19 U/L
BILIRUB DIRECT SERPL-MCNC: 0.2 MG/DL
BILIRUB INDIRECT SERPL-MCNC: 0.9 MG/DL
BILIRUB SERPL-MCNC: 1.2 MG/DL
CERULOPLASMIN SERPL-MCNC: 21 MG/DL
FERRITIN SERPL-MCNC: 24 NG/ML
HAPTOGLOB SERPL-MCNC: 103 MG/DL
IGG SER QL IEP: 970 MG/DL
INR PPP: 1.2 RATIO
IRON SATN MFR SERPL: 34 %
IRON SERPL-MCNC: 136 UG/DL
LDH SERPL-CCNC: 161 U/L
PROT SERPL-MCNC: 7.5 G/DL
PT BLD: 14.3 SEC
RBC # BLD: 5.14 M/UL
RETICS # AUTO: 1.1 %
RETICS AGGREG/RBC NFR: 58.6 K/UL
TIBC SERPL-MCNC: 402 UG/DL
UIBC SERPL-MCNC: 266 UG/DL

## 2025-03-08 LAB
HAV IGM SER QL: NONREACTIVE
HBV CORE IGG+IGM SER QL: NONREACTIVE
HBV CORE IGM SER QL: NONREACTIVE
HBV SURFACE AB SERPL IA-ACNC: 552.9 MIU/ML
HBV SURFACE AG SER QL: NONREACTIVE
HCV AB SER QL: NONREACTIVE
HCV S/CO RATIO: 0.09 S/CO
HEPATITIS A IGG ANTIBODY: REACTIVE
MITOCHONDRIA AB SER IF-ACNC: NORMAL
SMOOTH MUSCLE AB SER QL IF: ABNORMAL

## 2025-04-24 DIAGNOSIS — E61.1 IRON DEFICIENCY: ICD-10-CM

## 2025-04-24 RX ORDER — SODIUM SULFATE, POTASSIUM SULFATE AND MAGNESIUM SULFATE 1.6; 3.13; 17.5 G/177ML; G/177ML; G/177ML
17.5-3.13-1.6 SOLUTION ORAL
Qty: 2 | Refills: 0 | Status: ACTIVE | COMMUNITY
Start: 2025-04-24 | End: 1900-01-01

## 2025-05-06 ENCOUNTER — APPOINTMENT (OUTPATIENT)
Dept: INTERNAL MEDICINE | Facility: CLINIC | Age: 50
End: 2025-05-06
Payer: COMMERCIAL

## 2025-05-06 VITALS
OXYGEN SATURATION: 99 % | TEMPERATURE: 97.6 F | RESPIRATION RATE: 16 BRPM | HEART RATE: 71 BPM | BODY MASS INDEX: 23.55 KG/M2 | WEIGHT: 159 LBS | SYSTOLIC BLOOD PRESSURE: 109 MMHG | DIASTOLIC BLOOD PRESSURE: 71 MMHG | HEIGHT: 69 IN

## 2025-05-06 DIAGNOSIS — K64.9 UNSPECIFIED HEMORRHOIDS: ICD-10-CM

## 2025-05-06 DIAGNOSIS — E78.00 PURE HYPERCHOLESTEROLEMIA, UNSPECIFIED: ICD-10-CM

## 2025-05-06 DIAGNOSIS — B35.1 TINEA UNGUIUM: ICD-10-CM

## 2025-05-06 PROCEDURE — 99214 OFFICE O/P EST MOD 30 MIN: CPT

## 2025-05-06 PROCEDURE — G2211 COMPLEX E/M VISIT ADD ON: CPT | Mod: NC

## 2025-05-07 LAB
CHOLEST SERPL-MCNC: 214 MG/DL
HDLC SERPL-MCNC: 65 MG/DL
LDLC SERPL-MCNC: 135 MG/DL
NONHDLC SERPL-MCNC: 149 MG/DL
TRIGL SERPL-MCNC: 79 MG/DL

## 2025-06-26 ENCOUNTER — APPOINTMENT (OUTPATIENT)
Dept: GASTROENTEROLOGY | Facility: AMBULATORY MEDICAL SERVICES | Age: 50
End: 2025-06-26

## 2025-06-26 PROCEDURE — 43239 EGD BIOPSY SINGLE/MULTIPLE: CPT | Mod: 59

## 2025-06-26 PROCEDURE — 45380 COLONOSCOPY AND BIOPSY: CPT

## 2025-07-10 PROBLEM — K20.90 ESOPHAGITIS: Status: ACTIVE | Noted: 2025-07-10

## 2025-07-10 PROBLEM — K52.9 COLITIS: Status: ACTIVE | Noted: 2025-07-10

## 2025-08-05 ENCOUNTER — APPOINTMENT (OUTPATIENT)
Dept: CT IMAGING | Facility: CLINIC | Age: 50
End: 2025-08-05

## 2025-08-05 PROCEDURE — 74177 CT ABD & PELVIS W/CONTRAST: CPT | Mod: 26

## 2025-08-05 PROCEDURE — 74177 CT ABD & PELVIS W/CONTRAST: CPT

## 2025-08-05 PROCEDURE — 76700 US EXAM ABDOM COMPLETE: CPT
